# Patient Record
Sex: MALE | Race: WHITE
[De-identification: names, ages, dates, MRNs, and addresses within clinical notes are randomized per-mention and may not be internally consistent; named-entity substitution may affect disease eponyms.]

---

## 2018-02-19 ENCOUNTER — HOSPITAL ENCOUNTER (EMERGENCY)
Dept: HOSPITAL 25 - ED | Age: 23
Discharge: HOME | End: 2018-02-19
Payer: COMMERCIAL

## 2018-02-19 VITALS — SYSTOLIC BLOOD PRESSURE: 150 MMHG | DIASTOLIC BLOOD PRESSURE: 81 MMHG

## 2018-02-19 DIAGNOSIS — S86.002A: Primary | ICD-10-CM
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
## 2018-02-19 PROCEDURE — 99282 EMERGENCY DEPT VISIT SF MDM: CPT

## 2018-02-19 NOTE — ED
Lower Extremity





- HPI Summary


HPI Summary: 





22 male presents to ED with complaints of left ankle pain/posterior leg pain 

that began last night while playing basketball. Patient states he was running, 

slowing down and then stepped. He felt sharp shooting pain with the step. Doesn'

t remember feeling or hearing and "popping". Admits to swelling at ankle. No 

twisting motions. Has not taken any medication. Denies foot pain. No PMHx. No 

bruising or numbness/tingling. No knee or other injury. Extremely difficult to 

walk and bear weight however is able. No MSK previous injury/illness and no 

recent antibiotic use.





- History of Current Complaint


Chief Complaint: EDExtremityLower


Stated Complaint: LT FOOT INJURY


Time Seen by Provider: 02/19/18 15:50


Hx Obtained From: Patient


Mechanism Of Injury: Twisted, Other - abrupt stop while running and stepped 

down while playing basketball


Onset of Pain: Immediate, Post Accident


Onset/Duration: Still Present - since last night


Severity Initially: Moderate


Severity Currently: Moderate


Pain Intensity: 5


Pain Scale Used: 0-10 Numeric


Timing: Constant


Location: Is Discrete @ - left calf/achilles tendon area, no ankle or foot pain


Character Of Pain: Sharp, Dull, Aching


Associated Signs And Symptoms: Positive: Swelling.  Negative: Bruising, Weakness

, Knee Pain


Aggravating Factor(s): Standing, Ambulation, Weight Bearing


Alleviating Factor(s): Rest


Able to Bear Weight: Yes - but painful





- Allergies/Home Medications


Allergies/Adverse Reactions: 


 Allergies











Allergy/AdvReac Type Severity Reaction Status Date / Time


 


No Known Allergies Allergy   Unverified 11/06/13 13:26














PMH/Surg Hx/FS Hx/Imm Hx


Endocrine/Hematology History: 


   Denies: Hx Diabetes, Hx Thyroid Disease


Cardiovascular History: 


   Denies: Hx Hypertension


Respiratory History: 


   Denies: Hx Asthma, Hx Chronic Obstructive Pulmonary Disease (COPD)


GI History: 


   Denies: Hx Ulcer


Psychiatric History: 


   Denies: Hx Eating Disorder





- Surgical History


Surgery Procedure, Year, and Place: rt femur fx repair





- Immunization History


Immunizations Up to Date: Yes


Infectious Disease History: No


Infectious Disease History: 


   Denies: Hx Hepatitis, Hx Human Immunodeficiency Virus (HIV), Traveled 

Outside the [place] in Last 30 Days





- Family History


Known Family History: Positive: None





- Social History


Alcohol Use: Weekly


Hx Substance Use: Yes


Substance Use Type: Reports: None


Hx Tobacco Use: No


Smoking Status (MU): Never Smoked Tobacco





Review of Systems


Constitutional: Negative


Cardiovascular: Negative


Respiratory: Negative


Positive: Arthralgia, Myalgia, Decreased ROM, Edema - left lower leg


Skin: Negative


Neurological: Negative


All Other Systems Reviewed And Are Negative: Yes





Physical Exam


Triage Information Reviewed: Yes


Vital Signs On Initial Exam: 


 Initial Vitals











Temp Pulse Resp BP Pulse Ox


 


 98.9 F   111   16   143/93   98 


 


 02/19/18 15:43  02/19/18 15:43  02/19/18 15:43  02/19/18 15:43  02/19/18 15:43








tachycardia improved patient in pain, anxious 


Vital Signs Reviewed: Yes


Appearance: Positive: Well-Appearing, No Pain Distress, Well-Nourished


Skin: Positive: Warm, Skin Color Reflects Adequate Perfusion, Dry.  Negative: 

Cold, Numb, Erythema @


Head/Face: Positive: Normal Head/Face Inspection


Neck: Positive: Supple, Nontender


Respiratory/Lung Sounds: Positive: Clear to Auscultation, Breath Sounds 

Present.  Negative: Rales, Rhonchi, Wheezes


Cardiovascular: Positive: Normal, RRR, Pulses are Symmetrical in both Upper and 

Lower Extremities - 2+ pedal bl.  Negative: Murmur, Rub


Musculoskeletal: Positive: Limited @ - with plantar flexion due to pain 

posterior distal calf at achilles tendon of left LE. + ugarte test of left 

foot., Pain @ - left LE distal calf, achilles tendon area with slight deformity 

"bulging", Edema Left - achilles tendon, posteriorly, Other - rest of msk exam 

normal, no crepitus, step off or bony deformity or pain. appears tenod/muscular 

related.  Negative: Interruption @, Abnormal @


Neurological: Positive: Normal, Sensory/Motor Intact, Alert, Oriented to Person 

Place, Time, CN Intact II-III, NV Bundle Intact Distally, Abnormal Gait - due 

to pain and injury





Diagnostics





- Vital Signs


 Vital Signs











  Temp Pulse Resp BP Pulse Ox


 


 02/19/18 15:43  98.9 F  111  16  143/93  98














- Laboratory


Lab Statement: Any lab studies that have been ordered have been reviewed, and 

results considered in the medical decision making process.





Re-Evaluation





- Re-Evaluation


  ** First Eval


Re-Evaluation Time: 19:00


Change: Unchanged - updated that imaging could not be obtained as planned 

tonight, will be completed at ortho tomorrow





Lower Extremity Course/Dx





- Course


Course Of Treatment: spoke with dr kim about patient at 6:50pm who states 

attempt ultrasound. however due to technician stating could not be done until 

tomorrow and Dr Payne is the only radiologist able to read it, patient did 

not having imaging done. xray did not appear necessary at this time as pain was 

all in posterior leg without bony tenderness or deformity. may need in future, 

discussed with patient. Will follow up with Ortho tomorrow. CAM boot applied 

and crutches. non weight bearing, RICE and analgesica. given motrin while in 

ED. Continue at home. no physical activity. aware of worsening signs and 

symptoms. no other concerns at this time. appears to be suffering from achilles 

tendon partial tear/rupture due to PE findings. normal vitals. patient agrees 

and understands plan.





- Diagnoses


Differential Diagnosis/HQI/PQRI: Positive: Sprain, Strain, Tendonitis, Other - 

achilles tendon rupture


Provider Diagnoses: 


 Achilles tendon injury








- Physician Notifications


Discussed Care Of Patient With: Dr Kim


Time Discussed With Above Provider: 18:50


Instructed by Provider To: Have Pt Call For Appt. - obtian US, cam boot and 

follow up





Discharge





- Discharge Plan


Condition: Stable


Disposition: HOME


Patient Education Materials:  Achilles Tendon Rupture (ED)


Referrals: 


Sajan Kim MD [Medical Doctor] - 


Additional Instructions: 


Continue taking ibuprofen or aleve to help with pain and inflammation.


Rest, use crutches and wear CAM boot.


Avoid bearing weight and walking.


No physical activity. Ice and elevate.


Any new or worsening symptoms please seek medical attention promptly.


Follow up with Ortho, call to make an appointment tomorrow for further imaging, 

evaluation and treatment.

## 2018-03-01 ENCOUNTER — HOSPITAL ENCOUNTER (OUTPATIENT)
Dept: HOSPITAL 25 - OR | Age: 23
Discharge: HOME | End: 2018-03-01
Attending: ORTHOPAEDIC SURGERY
Payer: COMMERCIAL

## 2018-03-01 VITALS — DIASTOLIC BLOOD PRESSURE: 91 MMHG | SYSTOLIC BLOOD PRESSURE: 123 MMHG

## 2018-03-01 DIAGNOSIS — F17.210: ICD-10-CM
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
## 2018-03-01 DIAGNOSIS — S86.012A: Primary | ICD-10-CM

## 2018-03-01 RX ADMIN — FENTANYL CITRATE PRN MCG: 0.05 INJECTION, SOLUTION INTRAMUSCULAR; INTRAVENOUS at 15:02

## 2018-03-01 RX ADMIN — FENTANYL CITRATE PRN MCG: 0.05 INJECTION, SOLUTION INTRAMUSCULAR; INTRAVENOUS at 14:51

## 2018-03-01 NOTE — OP
Operative Report - Blank





- Operative Report


Date of Operation: 03/01/18


Note: 


PATIENT: Nawaf Perez





DATE OF BIRTH: [birth year]





DATE OF SURGERY: 3/1/18





SURGEON: Kuldip Costa MD





ASSISTANT: JENNIFER Buchanan, whos assistance was necessary for positioning, 

retraction, help with instrumentation, and closure.





ANESTHESIOLOGIST: Dr Moser





PREOPERATIVE DIAGNOSIS: Left Achilles tendon rupture





POSTOPERATIVE DIAGNOSIS: Left Achilles tendon rupture





OPERATION: Left Achilles tendon repair





ANESTHESIA: GETA and regional nerve block





IMPLANTS: none





TOURNIQUET TIME: Less than 60 minutes with a well-padded thigh tourniquet at 

250mmHg





SPECIMENS: none





ESTIMATED BLOOD LOSS: minimal





COMPLICATIONS: none





STATUS: Stable from the operating room to the recovery room and then home.





INDICATIONS FOR PROCEDURE:


Nawaf sustained a left achilles rupture playing basketball. Both operative and 

non operative treatment alternatives were reviewed. Further, the nature and 

risks of surgery were reviewed in careful detail in the office as well as in 

the preoperative holding area. Our discussions regarding the risks of surgery 

included, but were not limited to, infection, wound problems, nerve injury, 

neuroma, RSD, persistent symptoms, blood clot, re-rupture or failure to heal, 

failure of the surgery, and even the remote chance of catastrophic complication

, including loss of limb.





DESCRIPTION OF PROCEDURE:


The patient was seen in the preoperative holding unit and informed written 

consent was obtained.  The appropriate extremity was marked. The patient was 

then brought to the operating room and carefully positioned on the operating 

room table. Anesthesia was induced. All bony prominences were padded with great 

care. A well-padded thigh tourniquet was placed. A chlorhexidine based pre-

scrub was performed followed by a chloraprep prep and drape in standard sterile 

fashion. A surgical safety pause was then conducted in which we confirmed the 

appropriate patient, extremity, planned procedure, availability of equipment, 

indication and administration of prophylactic antibiotics, and DVT prophylaxis 

in the form of a compression boot on the non-surgical extremity. 





An Esmarch exsanguination of the extremity was performed and the tourniquet was 

inflated. I then began by making a 3 cm incision slightly posteromedial 

overlying the Achilles tendon. I carried the dissection down through the soft 

tissue and exposed the peritenon. I then came through this sharply in line with 

the Achilles. I exposed the Achilles tendon, which was completely ruptured. We 

irrigated out hematoma. At this point, I utilized an Allis clamp to pull the 

proximal segment distally and passed a malleable between the tendon and the 

peritenon posteriorly. This mobilized the proximal segment back to its proper 

location. I then passed a bent ringed forceps within the peritenon around the 

tendon proximally. We used a Juan Miguel needle to pass #2 Fiberwire through the skin 

and then through the forceps, Achilles, and then through the other side. By 

pulling the forceps back out of the wound, we brought the suture out through 

the wound having been passed through the tendon. We repeated this two 

additional times, moving approximately 1 cm distally through the proximal 

segment.  We similarly used the bent forceps to pass 3 strands of #2 Fiberwire 

through the distal segment. These sutures all had excellent purchase on the 

tendon. At this point, we placed a bump underneath the dorsum of the left foot 

to plantarflex the ankle. We then tied the sutures together, positioning the 

knot away from the skin edges. This nicely reapproximated the tendon ends with 

resting tension of the Achilles similar to the contralateral extremity, which 

had been assessed prior to prepping and draping. There was a restored Otero 

test.





We then irrigated copiously. We closed in layers meticulously utilizing 3-0 

Monocryl for the peritenon layer, 3-0 Monocryl for the subdermal layer, and 3-0 

nylon for the skin. A sterile dressing was then applied followed by a splint 

with the ankle in resting equinus position. 





The patient was then awakened from anesthesia and transferred to the recovery 

room in stable condition. There were no complications. All needle and sponge 

counts were correct at the end of the case.





ATTESTATION: I attest I was present and scrubbed and performed the critical 

portions of the procedure myself. 





POSTOPERATIVE PLAN: The patient will remain non-weight-bearing for two weeks 

and follow up in two weeks for likely suture removal and Steri-Strip 

application. We will then progress via my postoperative protocol.